# Patient Record
Sex: FEMALE | Race: WHITE | Employment: FULL TIME | URBAN - METROPOLITAN AREA
[De-identification: names, ages, dates, MRNs, and addresses within clinical notes are randomized per-mention and may not be internally consistent; named-entity substitution may affect disease eponyms.]

---

## 2023-04-15 ENCOUNTER — HOSPITAL ENCOUNTER (OUTPATIENT)
Age: 38
Discharge: HOME | End: 2023-04-15
Payer: COMMERCIAL

## 2023-04-15 VITALS
HEART RATE: 102 BPM | OXYGEN SATURATION: 100 % | RESPIRATION RATE: 18 BRPM | DIASTOLIC BLOOD PRESSURE: 78 MMHG | SYSTOLIC BLOOD PRESSURE: 119 MMHG | TEMPERATURE: 98 F

## 2023-04-15 DIAGNOSIS — J02.9 VIRAL PHARYNGITIS: ICD-10-CM

## 2023-04-15 DIAGNOSIS — J06.9 VIRAL URI: Primary | ICD-10-CM

## 2023-04-15 LAB
S PYO AG THROAT QL: NEGATIVE
SARS-COV-2 RNA RESP QL NAA+PROBE: NOT DETECTED

## 2023-04-15 RX ORDER — DEXAMETHASONE 4 MG/1
8 TABLET ORAL ONCE
Status: COMPLETED | OUTPATIENT
Start: 2023-04-15 | End: 2023-04-15

## 2023-04-15 NOTE — ED INITIAL ASSESSMENT (HPI)
Pt here with a sore throat  And dry cough for a week. Cough has been dry until this morning and is now coughing up green sputum. Pt wakes up in the mornings with congestion and then it clears up. Negative home covid test on Wednesday.

## 2023-05-01 ENCOUNTER — OFFICE VISIT (OUTPATIENT)
Dept: ALLERGY | Facility: CLINIC | Age: 38
End: 2023-05-01

## 2023-05-01 VITALS — SYSTOLIC BLOOD PRESSURE: 115 MMHG | OXYGEN SATURATION: 97 % | DIASTOLIC BLOOD PRESSURE: 75 MMHG | HEART RATE: 100 BPM

## 2023-05-01 DIAGNOSIS — Z23 FLU VACCINE NEED: ICD-10-CM

## 2023-05-01 DIAGNOSIS — J30.2 SEASONAL AND PERENNIAL ALLERGIC RHINOCONJUNCTIVITIS: Primary | ICD-10-CM

## 2023-05-01 DIAGNOSIS — J30.89 SEASONAL AND PERENNIAL ALLERGIC RHINOCONJUNCTIVITIS: Primary | ICD-10-CM

## 2023-05-01 DIAGNOSIS — Z92.29 COVID-19 VACCINE SERIES COMPLETED: ICD-10-CM

## 2023-05-01 DIAGNOSIS — J01.20 ACUTE ETHMOIDAL SINUSITIS, RECURRENCE NOT SPECIFIED: ICD-10-CM

## 2023-05-01 DIAGNOSIS — H10.10 SEASONAL AND PERENNIAL ALLERGIC RHINOCONJUNCTIVITIS: Primary | ICD-10-CM

## 2023-05-01 PROCEDURE — 3074F SYST BP LT 130 MM HG: CPT | Performed by: ALLERGY & IMMUNOLOGY

## 2023-05-01 PROCEDURE — 3078F DIAST BP <80 MM HG: CPT | Performed by: ALLERGY & IMMUNOLOGY

## 2023-05-01 PROCEDURE — 99204 OFFICE O/P NEW MOD 45 MIN: CPT | Performed by: ALLERGY & IMMUNOLOGY

## 2023-05-01 RX ORDER — AMOXICILLIN AND CLAVULANATE POTASSIUM 875; 125 MG/1; MG/1
1 TABLET, FILM COATED ORAL 2 TIMES DAILY
Qty: 20 TABLET | Refills: 0 | Status: SHIPPED | OUTPATIENT
Start: 2023-05-01 | End: 2023-05-11

## 2023-05-01 RX ORDER — LORATADINE 10 MG/1
10 TABLET ORAL DAILY
COMMUNITY
End: 2023-05-01

## 2023-05-01 RX ORDER — PREDNISONE 20 MG/1
TABLET ORAL
Qty: 10 TABLET | Refills: 0 | Status: SHIPPED | OUTPATIENT
Start: 2023-05-01

## 2023-05-01 RX ORDER — LEVOCETIRIZINE DIHYDROCHLORIDE 5 MG/1
5 TABLET, FILM COATED ORAL EVERY EVENING
Qty: 90 TABLET | Refills: 1 | Status: SHIPPED | OUTPATIENT
Start: 2023-05-01

## 2023-05-01 NOTE — PATIENT INSTRUCTIONS
#1 allergic rhinitis  Reviewed avoidance measures and potential treatment option immunotherapy  Recommend skin testing at next visit once patient to be off antihistamines for least 5 days  Increase prednisone from 20 mg once a day to 40 mg once a day with food x5 days  Continue with Singulair, montelukast 10 mg once a day  Continue with Flonase 2 sprays per nostril once a day  Reviewed potential side effects with Singulair  Trial of Xyzal, levocetirizine 5 mg once a day in place of Claritin  Continue with nasal saline sinus rinses    #2 acute sinusitis  Start Augmentin 875 twice a day x10 days  Continue with Mucinex D and sinus rinses. #3 COVID vaccines up-to-date. #4 flu vaccine up-to-date     skin testing at next visit once patient be off antihistamines for at least 5 days  Including Xyzal Claritin Allegra Zyrte          Orders This Visit:  No orders of the defined types were placed in this encounter. Meds This Visit:  Requested Prescriptions     Signed Prescriptions Disp Refills    levocetirizine 5 MG Oral Tab 90 tablet 1     Sig: Take 1 tablet (5 mg total) by mouth every evening. predniSONE 20 MG Oral Tab 10 tablet 0     Sig: Take 2 tabs(40 mg) once a day with food x 5 days    amoxicillin clavulanate 875-125 MG Oral Tab 20 tablet 0     Sig: Take 1 tablet by mouth 2 (two) times daily for 10 days.

## 2023-06-27 ENCOUNTER — OFFICE VISIT (OUTPATIENT)
Dept: OBGYN CLINIC | Facility: CLINIC | Age: 38
End: 2023-06-27
Payer: COMMERCIAL

## 2023-06-27 VITALS
SYSTOLIC BLOOD PRESSURE: 100 MMHG | HEIGHT: 62 IN | WEIGHT: 126 LBS | BODY MASS INDEX: 23.19 KG/M2 | DIASTOLIC BLOOD PRESSURE: 72 MMHG

## 2023-06-27 DIAGNOSIS — Z80.49 FAMILY HISTORY OF UTERINE CANCER: ICD-10-CM

## 2023-06-27 DIAGNOSIS — Z01.419 WOMEN'S ANNUAL ROUTINE GYNECOLOGICAL EXAMINATION: Primary | ICD-10-CM

## 2023-06-27 PROBLEM — N64.4 BREAST PAIN: Status: ACTIVE | Noted: 2023-06-27

## 2023-06-27 PROCEDURE — 99385 PREV VISIT NEW AGE 18-39: CPT | Performed by: OBSTETRICS & GYNECOLOGY

## 2023-06-27 PROCEDURE — 3078F DIAST BP <80 MM HG: CPT | Performed by: OBSTETRICS & GYNECOLOGY

## 2023-06-27 PROCEDURE — 3008F BODY MASS INDEX DOCD: CPT | Performed by: OBSTETRICS & GYNECOLOGY

## 2023-06-27 PROCEDURE — 3074F SYST BP LT 130 MM HG: CPT | Performed by: OBSTETRICS & GYNECOLOGY

## 2023-06-27 RX ORDER — SPIRONOLACTONE 50 MG/1
TABLET, FILM COATED ORAL
COMMUNITY

## 2023-06-27 RX ORDER — FLUOXETINE 10 MG/1
10 CAPSULE ORAL DAILY
COMMUNITY
Start: 2023-04-26 | End: 2023-06-27

## 2023-06-27 RX ORDER — FLUTICASONE PROPIONATE 50 MCG
2 SPRAY, SUSPENSION (ML) NASAL 2 TIMES DAILY
COMMUNITY

## 2023-06-27 RX ORDER — FLUOXETINE 10 MG/1
10 CAPSULE ORAL DAILY
Qty: 30 CAPSULE | Refills: 1 | Status: SHIPPED | OUTPATIENT
Start: 2023-06-27

## 2023-06-27 RX ORDER — MONTELUKAST SODIUM 10 MG/1
TABLET ORAL
COMMUNITY
Start: 2023-04-29

## 2023-07-31 ENCOUNTER — NURSE ONLY (OUTPATIENT)
Dept: ALLERGY | Facility: CLINIC | Age: 38
End: 2023-07-31

## 2023-07-31 ENCOUNTER — TELEPHONE (OUTPATIENT)
Dept: ALLERGY | Facility: CLINIC | Age: 38
End: 2023-07-31

## 2023-07-31 ENCOUNTER — OFFICE VISIT (OUTPATIENT)
Dept: ALLERGY | Facility: CLINIC | Age: 38
End: 2023-07-31

## 2023-07-31 VITALS — SYSTOLIC BLOOD PRESSURE: 111 MMHG | OXYGEN SATURATION: 100 % | DIASTOLIC BLOOD PRESSURE: 71 MMHG | HEART RATE: 79 BPM

## 2023-07-31 DIAGNOSIS — J30.2 SEASONAL AND PERENNIAL ALLERGIC RHINOCONJUNCTIVITIS: Primary | ICD-10-CM

## 2023-07-31 DIAGNOSIS — J30.89 SEASONAL AND PERENNIAL ALLERGIC RHINOCONJUNCTIVITIS: Primary | ICD-10-CM

## 2023-07-31 DIAGNOSIS — J01.20 ACUTE ETHMOIDAL SINUSITIS, RECURRENCE NOT SPECIFIED: ICD-10-CM

## 2023-07-31 DIAGNOSIS — H10.10 SEASONAL AND PERENNIAL ALLERGIC RHINOCONJUNCTIVITIS: Primary | ICD-10-CM

## 2023-07-31 DIAGNOSIS — Z92.29 COVID-19 VACCINE SERIES COMPLETED: ICD-10-CM

## 2023-07-31 PROCEDURE — 99214 OFFICE O/P EST MOD 30 MIN: CPT | Performed by: ALLERGY & IMMUNOLOGY

## 2023-07-31 PROCEDURE — 95004 PERQ TESTS W/ALRGNC XTRCS: CPT | Performed by: ALLERGY & IMMUNOLOGY

## 2023-07-31 PROCEDURE — 3078F DIAST BP <80 MM HG: CPT | Performed by: ALLERGY & IMMUNOLOGY

## 2023-07-31 PROCEDURE — 3074F SYST BP LT 130 MM HG: CPT | Performed by: ALLERGY & IMMUNOLOGY

## 2023-07-31 PROCEDURE — 95024 IQ TESTS W/ALLERGENIC XTRCS: CPT | Performed by: ALLERGY & IMMUNOLOGY

## 2023-07-31 RX ORDER — MONTELUKAST SODIUM 10 MG/1
TABLET ORAL
Qty: 90 TABLET | Refills: 2 | Status: SHIPPED | OUTPATIENT
Start: 2023-07-31

## 2023-07-31 NOTE — TELEPHONE ENCOUNTER
Patient to start allergy shots on 08/09/23  Patient aware to take a 24 hour antihistamines at least 2 hours before appointment and to feel well/healthy   RN informed patient that she will need to stay 30 minutes for each appointment but plan for the 1st appointment to be longer due to consent and paperwork  Patient verbalized understanding and denied further questions  [facility]

## 2023-07-31 NOTE — PATIENT INSTRUCTIONS
1 allergic rhinitis  Overall 80% improved with medications including Xyzal Singulair and Flonase  Patient is considering immunotherapy as a parenteral additive treatment option. See above skin testing to screen for allergic triggers  Reviewed avoidance measures and potential treatment option immunotherapy  Denies side effects with medications including Singulair  Singular refilled  As above  Immunotherapy program discussed in detail, including the potential adverse events of local and systemic reaction. Reviewed 30 minute wait in office after receiving immunotherapy. Pt/parent understands, agrees and wishes to proceed. Consent to be signed prior to starting. #2 COVID vaccines completed. Recommend booster when indicated    #3 acute sinusitis. Resolved.   No further episodes in the interim    #4 flu vaccine recommended in the fall

## 2023-07-31 NOTE — PROGRESS NOTES
Sari Novoa is a 43year old female. HPI:   Patient presents with: Allergies: Here for environmental/seasonal allergy testing. Symptoms include sneezing, post nasal drip, and sore throat. No antihistamines for 5 days. Patient is a 18-year-old female who presents for follow-up with a chief complaint of allergies  Patient last seen by me in May 2023. No prior skin testing at that time as patient was on antihistamines. At last visit patient history of acute sinusitis and allergic rhinitis    Medication list include Xyzal Flonase and Singulair  COVID vaccines up-to-date    Last visit patient was started on Dymista    Today patient presents for follow-up and potential repeat allergy testing    Today patient reports    Ar:  Active or persistent symptoms: sz pnd, st   80 % better   Active meds: Flonase Xyzal Singulair  pets : none     No abx or pred in interim     COVID-vaccine x3 doses including by Valent    Pt request retesting     Considering ait              HISTORY:  Past Medical History:   Diagnosis Date    Anxiety     Family history of uterine cancer     with mom, mat aunt and mat cousin      Past Surgical History:   Procedure Laterality Date    PATIENT DENIES ANY SURGICAL HISTORY        Family History   Problem Relation Age of Onset    Uterine Cancer Mother 71    Uterine Cancer Maternal Aunt     Uterine Cancer Maternal Cousin Female       Social History:   Social History     Socioeconomic History    Marital status:    Tobacco Use    Smoking status: Former     Types: Cigarettes    Smokeless tobacco: Never    Tobacco comments:     Former use at about age 24    Vaping Use    Vaping Use: Never used   Substance and Sexual Activity    Alcohol use: Yes     Comment: occ    Drug use: Never    Sexual activity: Yes     Partners: Male     Birth control/protection: Condom   Other Topics Concern    Blood Transfusions No   Social History Narrative    Pt denies h/x abuse.          Medications (Active prior to today's visit):  Current Outpatient Medications   Medication Sig Dispense Refill    montelukast 10 MG Oral Tab 10 mg once a day orally 90 tablet 2    fluticasone propionate 50 MCG/ACT Nasal Suspension 2 sprays by Each Nare route 2 (two) times daily. spironolactone 50 MG Oral Tab       FLUoxetine 10 MG Oral Cap Take 1 capsule (10 mg total) by mouth daily. 30 capsule 1    levocetirizine 5 MG Oral Tab Take 1 tablet (5 mg total) by mouth every evening.  (Patient not taking: Reported on 7/31/2023) 90 tablet 1       Allergies:    Sulfamethoxazole W/*    RASH    Comment:With itching      ROS:   Allergic/Immuno:  See hpi  Cardiovascular:  Negative for irregular heartbeat/palpitations, chest pain, edema  Constitutional:  Negative night sweats,weight loss, irritability and lethargy  ENMT:  Negative for ear drainage, hearing loss and nasal drainage  Eyes:  Negative for eye discharge and vision loss  Gastrointestinal:  Negative for abdominal pain, diarrhea and vomiting  Integumentary:  Negative for pruritus and rash  Respiratory:  Negative for cough, dyspnea and wheezing    PHYSICAL EXAM:   Constitutional: responsive, no acute distress noted  Head/Face: NC/Atraumatic  Eyes/Vision: conjunctiva and lids are normal extraocular motion is intact   Ears/Audiometry: tympanic membranes are normal bilaterally hearing is grossly intact  Nose/Mouth/Throat: nose and throat are clear mucous membranes are moist   Neck/Thyroid: neck is supple without adenopathy  Lymphatic: no abnormal cervical, supraclavicular or axillary adenopathy is noted  Respiratory: normal to inspection lungs are clear to auscultation bilaterally normal respiratory effort   Cardiovascular: regular rate and rhythm no murmurs, gallups, or rubs  Abdomen: soft non-tender non-distended  Skin/Hair: no unusual rashes present   Extremities: no edema, cyanosis, or clubbing     ASSESSMENT/PLAN:   Assessment   Seasonal and perennial allergic rhinoconjunctivitis  (primary encounter diagnosis)  Covid-19 vaccine series completed  Acute ethmoidal sinusitis, recurrence not specified    Skin testing today to common indoor and outdoor environmental allergies was + to weeds, mold dog      Positive histamine control    #1 allergic rhinitis  Overall 80% improved with medications including Xyzal Singulair and Flonase  Patient is considering immunotherapy as a parenteral additive treatment option. See above skin testing to screen for allergic triggers  Reviewed avoidance measures and potential treatment option immunotherapy  Denies side effects with medications including Singulair  Singular refilled  As above  Immunotherapy program discussed in detail, including the potential adverse events of local and systemic reaction. Reviewed 30 minute wait in office after receiving immunotherapy. Pt/parent understands, agrees and wishes to proceed. Consent to be signed prior to starting. #2 COVID vaccines completed. Recommend booster when indicated    #3 acute sinusitis. Resolved. No further episodes in the interim    #4 flu vaccine recommended in the fall      F/u in 6 months       Over 30 spent on care and visit         Orders This Visit:  No orders of the defined types were placed in this encounter. Meds This Visit:  Requested Prescriptions     Signed Prescriptions Disp Refills    montelukast 10 MG Oral Tab 90 tablet 2     Sig: 10 mg once a day orally       Imaging & Referrals:  None     7/31/2023  Shobha Powell MD    If medication samples were provided today, they were provided solely for patient education and training related to self administration of these medications. Teaching, instruction and sample was provided to the patient by myself. Teaching included  a review of potential adverse side effects as well as potential efficacy. Patient's questions were answered in regards to medication administration and dosing and potential side effects.  Teaching was provided via the teach back method

## 2023-08-08 ENCOUNTER — PATIENT MESSAGE (OUTPATIENT)
Dept: ALLERGY | Facility: CLINIC | Age: 38
End: 2023-08-08

## 2023-08-08 NOTE — TELEPHONE ENCOUNTER
From: Lakeisha Martin  To: Mabel Urrutia MD  Sent: 8/8/2023 3:12 PM CDT  Subject: CPT Code    Hi there,     Wondering if you can give me the CPT Code for the allergy injections to verify coverage with my insurance, please? Thanks in advance!      Deisy Strong

## 2023-08-09 NOTE — TELEPHONE ENCOUNTER
RN called to patient  Verified patient's name and   RN informed patient that note for medical necessity was created for patient to receive allergy shots  Per patient will keep appointment for today to  letter but will hold off for first shot for now until insurance approves the letter  Patient verbalized understanding to plan of care and denied further questions

## 2023-08-09 NOTE — TELEPHONE ENCOUNTER
Dr. Aundrea Pittman please see patient's MyChart message and advise, thank you    [facility],      Thanks so much for your speedy response. Just spoke to my insurance and this was the reply:   Can be covered if it falls under medical necessity, but they need documentation. Dr has to fill out or state that it is medically necessary and that it NEEDS to be done. That it needs to be required at the time of service. Additional clarification can be requested at  [phone] if needed. Is that possible - I'm unsure now if I qualify then? \"

## 2023-08-09 NOTE — TELEPHONE ENCOUNTER
Call reviewed and noted  May provide letter stating \"allergy shots/immunotherapy are of  medical necessity and and needed to treat her underlying environmental allergens to provide preventative treatment\"

## 2023-08-21 RX ORDER — FLUOXETINE 10 MG/1
10 CAPSULE ORAL DAILY
Qty: 30 CAPSULE | Refills: 1 | Status: SHIPPED | OUTPATIENT
Start: 2023-08-21

## 2023-08-30 ENCOUNTER — OFFICE VISIT (OUTPATIENT)
Dept: OBGYN CLINIC | Facility: CLINIC | Age: 38
End: 2023-08-30
Payer: COMMERCIAL

## 2023-08-30 VITALS — BODY MASS INDEX: 22.84 KG/M2 | HEIGHT: 62 IN | WEIGHT: 124.13 LBS

## 2023-08-30 DIAGNOSIS — Z12.4 SCREENING FOR CERVICAL CANCER: Primary | ICD-10-CM

## 2023-08-30 PROCEDURE — 87624 HPV HI-RISK TYP POOLED RSLT: CPT | Performed by: OBSTETRICS & GYNECOLOGY

## 2023-08-30 PROCEDURE — 3008F BODY MASS INDEX DOCD: CPT | Performed by: OBSTETRICS & GYNECOLOGY

## 2023-08-31 LAB — HPV I/H RISK 1 DNA SPEC QL NAA+PROBE: NEGATIVE

## 2023-09-15 ENCOUNTER — TELEPHONE (OUTPATIENT)
Dept: OBGYN CLINIC | Facility: CLINIC | Age: 38
End: 2023-09-15

## 2023-09-15 RX ORDER — FLUOXETINE 10 MG/1
10 CAPSULE ORAL DAILY
Qty: 90 CAPSULE | Refills: 3 | Status: SHIPPED | OUTPATIENT
Start: 2023-09-15

## 2023-10-23 RX ORDER — LEVOCETIRIZINE DIHYDROCHLORIDE 5 MG/1
5 TABLET, FILM COATED ORAL EVERY EVENING
Qty: 90 TABLET | Refills: 1 | Status: SHIPPED | OUTPATIENT
Start: 2023-10-23

## 2023-10-23 NOTE — TELEPHONE ENCOUNTER
Refill requested for   Requested Prescriptions   Pending Prescriptions Disp Refills    LEVOCETIRIZINE 5 MG Oral Tab [Pharmacy Med Name: LEVOCETIRIZINE 5 MG TABLET] 90 tablet 1     Sig: TAKE 1 TABLET BY MOUTH EVERY DAY IN THE EVENING       Antihistamines Passed - 10/23/2023  1:32 AM        Passed - Appt in past 12 mos or next 1 mos     Recent Outpatient Visits              1 month ago Screening for cervical cancer    [facility], [address] Demar Mendoza MD    Office Visit    2 months ago Seasonal and perennial allergic rhinoconjunctivitis    [facility], [address], [place]    Nurse Only    2 months ago Seasonal and perennial allergic rhinoconjunctivitis    [address] [facility] Melissa Shaikh MD    Office Visit    3 months ago [employer] annual routine gynecological examination    [address], [address], [address] Demar Mendoza MD    Office Visit    5 months ago Seasonal and perennial allergic rhinoconjunctivitis    [address] [facility], Brandon Velasquez MD    Office Visit                          Last office visit: 7/31/23    Previously advised to follow up in No follow-up timeline advised in last office visit. Diagnosis of AR advised to follow-up in 1 year    F/U currently scheduled?  Not at this time    ACTION: Refill filled per protocol

## 2024-02-19 ENCOUNTER — TELEPHONE (OUTPATIENT)
Dept: ALLERGY | Facility: CLINIC | Age: 39
End: 2024-02-19

## 2024-02-19 ENCOUNTER — PATIENT MESSAGE (OUTPATIENT)
Dept: ALLERGY | Facility: CLINIC | Age: 39
End: 2024-02-19

## 2024-02-19 NOTE — TELEPHONE ENCOUNTER
From: Georgie Boykin  To: Osman Urrutia  Sent: 2/19/2024 10:26 AM CST  Subject: Injections Question    Good morning,     I'm wondering if I'm still eligible to schedule allergy injection appointments. I had some scheduling conflicts the past several months so I wasn't able to before but still am interested in them, if possible.    Thank you,  Linsey

## 2024-02-19 NOTE — TELEPHONE ENCOUNTER
Patient is starting allergy shots tomorrow, 2/20/24.    Last office visit was 7/31/23 and tested to Mold, Weeds and Dog.      May we have orders ? thanks

## 2024-02-19 NOTE — TELEPHONE ENCOUNTER
RN called pt to reschedule AIT.    Pt will be starting AIT tomorrow morning.  RN advised that pt should be feeling well prior to injections and to take a 24 hour antihistamines prior to injections.  RN also advised that she must wait 30 minutes for monitoring.     Pt verbalizes understanding and denies any further questions or concerns.

## 2024-02-20 ENCOUNTER — NURSE ONLY (OUTPATIENT)
Dept: ALLERGY | Facility: CLINIC | Age: 39
End: 2024-02-20
Payer: COMMERCIAL

## 2024-02-20 DIAGNOSIS — J30.89 ENVIRONMENTAL AND SEASONAL ALLERGIES: Primary | ICD-10-CM

## 2024-02-20 PROCEDURE — 95117 IMMUNOTHERAPY INJECTIONS: CPT | Performed by: ALLERGY & IMMUNOLOGY

## 2024-02-20 PROCEDURE — 95165 ANTIGEN THERAPY SERVICES: CPT | Performed by: ALLERGY & IMMUNOLOGY

## 2024-02-29 ENCOUNTER — NURSE ONLY (OUTPATIENT)
Dept: ALLERGY | Facility: CLINIC | Age: 39
End: 2024-02-29
Payer: COMMERCIAL

## 2024-02-29 DIAGNOSIS — J30.89 ENVIRONMENTAL AND SEASONAL ALLERGIES: Primary | ICD-10-CM

## 2024-02-29 PROCEDURE — 95117 IMMUNOTHERAPY INJECTIONS: CPT | Performed by: ALLERGY & IMMUNOLOGY

## 2024-03-05 ENCOUNTER — NURSE ONLY (OUTPATIENT)
Dept: ALLERGY | Facility: CLINIC | Age: 39
End: 2024-03-05
Payer: COMMERCIAL

## 2024-03-05 DIAGNOSIS — J30.89 ENVIRONMENTAL AND SEASONAL ALLERGIES: Primary | ICD-10-CM

## 2024-03-05 PROCEDURE — 95117 IMMUNOTHERAPY INJECTIONS: CPT | Performed by: ALLERGY & IMMUNOLOGY

## 2024-03-14 ENCOUNTER — NURSE ONLY (OUTPATIENT)
Dept: ALLERGY | Facility: CLINIC | Age: 39
End: 2024-03-14
Payer: COMMERCIAL

## 2024-03-14 DIAGNOSIS — J30.89 ENVIRONMENTAL AND SEASONAL ALLERGIES: Primary | ICD-10-CM

## 2024-03-14 PROCEDURE — 95117 IMMUNOTHERAPY INJECTIONS: CPT | Performed by: ALLERGY & IMMUNOLOGY

## 2024-04-03 ENCOUNTER — NURSE ONLY (OUTPATIENT)
Dept: ALLERGY | Facility: CLINIC | Age: 39
End: 2024-04-03
Payer: COMMERCIAL

## 2024-04-03 DIAGNOSIS — J30.89 ENVIRONMENTAL AND SEASONAL ALLERGIES: Primary | ICD-10-CM

## 2024-04-03 PROCEDURE — 95117 IMMUNOTHERAPY INJECTIONS: CPT | Performed by: ALLERGY & IMMUNOLOGY

## 2024-04-11 ENCOUNTER — NURSE ONLY (OUTPATIENT)
Dept: ALLERGY | Facility: CLINIC | Age: 39
End: 2024-04-11
Payer: COMMERCIAL

## 2024-04-11 DIAGNOSIS — J30.89 ENVIRONMENTAL AND SEASONAL ALLERGIES: Primary | ICD-10-CM

## 2024-04-11 PROCEDURE — 95117 IMMUNOTHERAPY INJECTIONS: CPT | Performed by: ALLERGY & IMMUNOLOGY

## 2024-04-11 PROCEDURE — 95165 ANTIGEN THERAPY SERVICES: CPT | Performed by: ALLERGY & IMMUNOLOGY

## 2024-04-15 ENCOUNTER — NURSE ONLY (OUTPATIENT)
Dept: ALLERGY | Facility: CLINIC | Age: 39
End: 2024-04-15
Payer: COMMERCIAL

## 2024-04-15 DIAGNOSIS — J30.89 ENVIRONMENTAL AND SEASONAL ALLERGIES: Primary | ICD-10-CM

## 2024-04-15 PROCEDURE — 95117 IMMUNOTHERAPY INJECTIONS: CPT | Performed by: ALLERGY & IMMUNOLOGY

## 2024-04-18 ENCOUNTER — NURSE ONLY (OUTPATIENT)
Dept: ALLERGY | Facility: CLINIC | Age: 39
End: 2024-04-18
Payer: COMMERCIAL

## 2024-04-18 DIAGNOSIS — J30.89 ENVIRONMENTAL AND SEASONAL ALLERGIES: Primary | ICD-10-CM

## 2024-04-18 PROCEDURE — 95117 IMMUNOTHERAPY INJECTIONS: CPT | Performed by: ALLERGY & IMMUNOLOGY

## 2024-04-22 ENCOUNTER — NURSE ONLY (OUTPATIENT)
Dept: ALLERGY | Facility: CLINIC | Age: 39
End: 2024-04-22
Payer: COMMERCIAL

## 2024-04-22 DIAGNOSIS — J30.89 ENVIRONMENTAL AND SEASONAL ALLERGIES: Primary | ICD-10-CM

## 2024-04-22 RX ORDER — MONTELUKAST SODIUM 10 MG/1
TABLET ORAL
Qty: 30 TABLET | Refills: 0 | Status: SHIPPED | OUTPATIENT
Start: 2024-04-22

## 2024-04-22 NOTE — TELEPHONE ENCOUNTER
Received a refill request for Singulair 10 mg- 1 tablet by mouth daily.    Last office visit was 7/31/23 for allergies and per Dr. Urrutia's progress notes to follow up in 6 months.    Refill for 30 days granted.    Conyac message sent to patient to please contact our office to schedule a follow up visit either in person or telemed.

## 2024-04-29 ENCOUNTER — NURSE ONLY (OUTPATIENT)
Dept: ALLERGY | Facility: CLINIC | Age: 39
End: 2024-04-29
Payer: COMMERCIAL

## 2024-04-29 DIAGNOSIS — J30.89 ENVIRONMENTAL AND SEASONAL ALLERGIES: Primary | ICD-10-CM

## 2024-05-06 ENCOUNTER — NURSE ONLY (OUTPATIENT)
Dept: ALLERGY | Facility: CLINIC | Age: 39
End: 2024-05-06
Payer: COMMERCIAL

## 2024-05-06 DIAGNOSIS — J30.89 ENVIRONMENTAL AND SEASONAL ALLERGIES: Primary | ICD-10-CM

## 2024-05-13 ENCOUNTER — TELEMEDICINE (OUTPATIENT)
Dept: ALLERGY | Facility: CLINIC | Age: 39
End: 2024-05-13
Payer: COMMERCIAL

## 2024-05-13 ENCOUNTER — NURSE ONLY (OUTPATIENT)
Dept: ALLERGY | Facility: CLINIC | Age: 39
End: 2024-05-13
Payer: COMMERCIAL

## 2024-05-13 DIAGNOSIS — J30.89 SEASONAL AND PERENNIAL ALLERGIC RHINOCONJUNCTIVITIS: Primary | ICD-10-CM

## 2024-05-13 DIAGNOSIS — Z23 FLU VACCINE NEED: ICD-10-CM

## 2024-05-13 DIAGNOSIS — J30.89 ENVIRONMENTAL AND SEASONAL ALLERGIES: Primary | ICD-10-CM

## 2024-05-13 DIAGNOSIS — J30.2 SEASONAL AND PERENNIAL ALLERGIC RHINOCONJUNCTIVITIS: Primary | ICD-10-CM

## 2024-05-13 DIAGNOSIS — H10.10 SEASONAL AND PERENNIAL ALLERGIC RHINOCONJUNCTIVITIS: Primary | ICD-10-CM

## 2024-05-13 DIAGNOSIS — Z23 NEED FOR COVID-19 VACCINE: ICD-10-CM

## 2024-05-13 NOTE — PROGRESS NOTES
Georgie Boykin is a 38 year old female.    HPI:   No chief complaint on file.  Patient is a 38-year-old female who presents for follow-up with a chief complaint of allergies    Patient last seen by me in July 2022  Patient presents via video visit    This visit is conducted using Telemedicine with live, interactive video and audio during this Coronavirus pandemic.     Please note that the following visit was completed using two-way, real-time interactive audio and/or video communication.  This has been done in good isra to provide continuity of care in the best interest of the provider-patient relationship, due to the ongoing public health crisis/national emergency and because of restrictions of visitation.  There are limitations of this visit as no physical exam could be performed.  Every conscious effort was taken to allow for sufficient and adequate time.  This billing was spent on reviewing labs, medications, radiology tests and decision making.  Appropriate medical decision-making and tests are ordered as detailed in the plan of care below     Patient with a history of allergic rhinitis.  Positive skin testing to weeds mold and dog.  Patient currently on immunotherapy underlying environmental allergies.    Medication list include Xyzal Singulair Flonase  80% better with meds  Patient currently on buildup phase immunotherapy to underlying environmental allergies.  Current dosing are yellow vials 1-10,000 0.3 mL    COVID-vaccine x 5 doses last in October 2023  No flu vaccine on record    Today patient reports    Ar:  Active or persistent symptoms: some better with ait to date   Active meds: flonase , xyzal   Ran out of singulair   pets :  2 dogs     No sinus infection in interim           HISTORY:  Past Medical History:    Anxiety    Family history of uterine cancer    with mom, mat aunt and mat cousin      Past Surgical History:   Procedure Laterality Date    Patient denies any surgical history        Family  History   Problem Relation Age of Onset    Uterine Cancer Mother 69    Uterine Cancer Maternal Aunt     Uterine Cancer Maternal Cousin Female       Social History:   Social History     Socioeconomic History    Marital status:    Tobacco Use    Smoking status: Former     Types: Cigarettes    Smokeless tobacco: Never    Tobacco comments:     Former use at about age 17    Vaping Use    Vaping status: Never Used   Substance and Sexual Activity    Alcohol use: Yes     Comment: occ    Drug use: Never    Sexual activity: Yes     Partners: Male     Birth control/protection: Condom   Other Topics Concern    Blood Transfusions No   Social History Narrative    Pt denies h/x abuse.         Medications (Active prior to today's visit):  Current Outpatient Medications   Medication Sig Dispense Refill    montelukast 10 MG Oral Tab TAKE 1 TABLET BY MOUTH EVERY DAY 30 tablet 0    LEVOCETIRIZINE 5 MG Oral Tab TAKE 1 TABLET BY MOUTH EVERY DAY IN THE EVENING 90 tablet 1    FLUoxetine 10 MG Oral Cap Take 1 capsule (10 mg total) by mouth daily. 90 capsule 3    fluticasone propionate 50 MCG/ACT Nasal Suspension 2 sprays by Each Nare route 2 (two) times daily.      spironolactone 50 MG Oral Tab          Allergies:  Allergies   Allergen Reactions    Sulfamethoxazole W/Trimethoprim RASH     With itching         ROS:   Allergic/Immuno:  See hpi  Cardiovascular:  Negative for irregular heartbeat/palpitations, chest pain, edema  Constitutional:  Negative night sweats,weight loss, irritability and lethargy  ENMT:  Negative for ear drainage, hearing loss and nasal drainage  Eyes:  Negative for eye discharge and vision loss  Gastrointestinal:  Negative for abdominal pain, diarrhea and vomiting  Integumentary:  Negative for pruritus and rash  Respiratory:  Negative for cough, dyspnea and wheezing    PHYSICAL EXAM:   Constitutional: responsive, no acute distress noted  Head/Face: NC/Atraumatic  Eyes/Vision: conjunctiva and lids are normal  extraocular motion is intact   Ears/Audiometry: tympanic membranes are normal bilaterally hearing is grossly intact  Nose/Mouth/Throat: nose and throat are clear mucous membranes are moist   Neck/Thyroid: neck is supple without adenopathy  Lymphatic: no abnormal cervical, supraclavicular or axillary adenopathy is noted  Respiratory: normal to inspection lungs are clear to auscultation bilaterally normal respiratory effort   Cardiovascular: regular rate and rhythm no murmurs, gallups, or rubs  Abdomen: soft non-tender non-distended  Skin/Hair: no unusual rashes present   Extremities: no edema, cyanosis, or clubbing     ASSESSMENT/PLAN:   Assessment   Encounter Diagnoses   Name Primary?    Seasonal and perennial allergic rhinoconjunctivitis Yes    Flu vaccine need     Need for COVID-19 vaccine      #1 allergic rhinitis  Has started immunotherapy in the interim.  Currently on buildup phase yellow vial 0.1 mL.  Weekly dosing at this time.  Does report some improvement since starting immunotherapy.  Immunotherapy is to mold weeds and dog.  2 dogs in the home.  Continue with Xyzal and Flonase.  Restart Singulair and increasing symptoms.  Reviewed avoidance measures  Meds already refilled       #2 flu vaccine recommended in the fall      #3 COVID vaccines up-to-date including booster from October 2023           Orders This Visit:  No orders of the defined types were placed in this encounter.      Meds This Visit:  Requested Prescriptions      No prescriptions requested or ordered in this encounter       Imaging & Referrals:  None     5/13/2024  Osman Urrutia MD    If medication samples were provided today, they were provided solely for patient education and training related to self administration of these medications.  Teaching, instruction and sample was provided to the patient by myself.  Teaching included  a review of potential adverse side effects as well as potential efficacy.  Patient's questions were answered in  regards to medication administration and dosing and potential side effects. Teaching was provided via the teach back method

## 2024-05-15 RX ORDER — LEVOCETIRIZINE DIHYDROCHLORIDE 5 MG/1
5 TABLET, FILM COATED ORAL EVERY EVENING
Qty: 90 TABLET | Refills: 1 | Status: SHIPPED | OUTPATIENT
Start: 2024-05-15

## 2024-05-15 NOTE — TELEPHONE ENCOUNTER
Refill requested for   Requested Prescriptions   Pending Prescriptions Disp Refills    levocetirizine 5 MG Oral Tab 90 tablet 1     Sig: Take 1 tablet (5 mg total) by mouth every evening.       Antihistamines Passed - 5/15/2024  8:30 AM        Passed - Appt in past 12 mos or next 1 mos     Recent Outpatient Visits              2 days ago Environmental and seasonal allergies [J30.89]    [facility]    Nurse Only    2 days ago Seasonal and perennial allergic rhinoconjunctivitis    [facility] Ghada, Osman NOE MD    Telemedicine    1 week ago Environmental and seasonal allergies    [facility]    Nurse Only    2 weeks ago Environmental and seasonal allergies [J30.89]    [facility]    Nurse Only    3 weeks ago Environmental and seasonal allergies [J30.89]    [facility]    Nurse Only          Future Appointments         Provider Department Appt Notes    In 5 days [facility]     In 1 week [facility]                           Last office visit: 5/13/24    Previously advised to follow up in one year    F/U currently scheduled? none      Date of last refill: 10/23/23    ACTION: Refilled per protocol.

## 2024-05-20 ENCOUNTER — NURSE ONLY (OUTPATIENT)
Dept: ALLERGY | Facility: CLINIC | Age: 39
End: 2024-05-20

## 2024-05-20 DIAGNOSIS — J30.89 ENVIRONMENTAL AND SEASONAL ALLERGIES: Primary | ICD-10-CM

## 2024-06-04 ENCOUNTER — NURSE ONLY (OUTPATIENT)
Dept: ALLERGY | Facility: CLINIC | Age: 39
End: 2024-06-04
Payer: COMMERCIAL

## 2024-06-04 DIAGNOSIS — J30.89 ENVIRONMENTAL AND SEASONAL ALLERGIES: Primary | ICD-10-CM

## 2024-06-04 PROCEDURE — 95117 IMMUNOTHERAPY INJECTIONS: CPT | Performed by: ALLERGY & IMMUNOLOGY

## 2024-06-10 ENCOUNTER — NURSE ONLY (OUTPATIENT)
Dept: ALLERGY | Facility: CLINIC | Age: 39
End: 2024-06-10
Payer: COMMERCIAL

## 2024-06-10 DIAGNOSIS — J30.89 ENVIRONMENTAL AND SEASONAL ALLERGIES: Primary | ICD-10-CM

## 2024-06-17 ENCOUNTER — NURSE ONLY (OUTPATIENT)
Dept: ALLERGY | Facility: CLINIC | Age: 39
End: 2024-06-17
Payer: COMMERCIAL

## 2024-06-17 DIAGNOSIS — J30.89 ENVIRONMENTAL AND SEASONAL ALLERGIES: Primary | ICD-10-CM

## 2024-06-17 PROCEDURE — 95117 IMMUNOTHERAPY INJECTIONS: CPT | Performed by: ALLERGY & IMMUNOLOGY

## 2024-06-17 PROCEDURE — 95165 ANTIGEN THERAPY SERVICES: CPT | Performed by: ALLERGY & IMMUNOLOGY

## 2024-06-24 ENCOUNTER — NURSE ONLY (OUTPATIENT)
Dept: ALLERGY | Facility: CLINIC | Age: 39
End: 2024-06-24

## 2024-06-24 DIAGNOSIS — J30.89 ENVIRONMENTAL AND SEASONAL ALLERGIES: Primary | ICD-10-CM

## 2024-06-24 PROCEDURE — 95117 IMMUNOTHERAPY INJECTIONS: CPT | Performed by: ALLERGY & IMMUNOLOGY

## 2024-06-25 ENCOUNTER — TELEPHONE (OUTPATIENT)
Dept: ALLERGY | Facility: CLINIC | Age: 39
End: 2024-06-25

## 2024-06-25 RX ORDER — MONTELUKAST SODIUM 10 MG/1
TABLET ORAL
Qty: 90 TABLET | Refills: 1 | Status: SHIPPED | OUTPATIENT
Start: 2024-06-25

## 2024-06-25 NOTE — TELEPHONE ENCOUNTER
Received a refill request for Singulair 10 mg- 1 tablet by mouth daily.    Last office visit was 5/13/24 for allergies.    Refill meets protocol- refilled.

## 2024-07-01 ENCOUNTER — NURSE ONLY (OUTPATIENT)
Dept: ALLERGY | Facility: CLINIC | Age: 39
End: 2024-07-01
Payer: COMMERCIAL

## 2024-07-01 DIAGNOSIS — J30.89 ENVIRONMENTAL AND SEASONAL ALLERGIES: Primary | ICD-10-CM

## 2024-07-08 ENCOUNTER — NURSE ONLY (OUTPATIENT)
Dept: ALLERGY | Facility: CLINIC | Age: 39
End: 2024-07-08
Payer: COMMERCIAL

## 2024-07-08 DIAGNOSIS — J30.89 ENVIRONMENTAL AND SEASONAL ALLERGIES: Primary | ICD-10-CM

## 2024-07-08 PROCEDURE — 95117 IMMUNOTHERAPY INJECTIONS: CPT | Performed by: ALLERGY & IMMUNOLOGY

## 2024-07-15 ENCOUNTER — NURSE ONLY (OUTPATIENT)
Dept: ALLERGY | Facility: CLINIC | Age: 39
End: 2024-07-15
Payer: COMMERCIAL

## 2024-07-15 DIAGNOSIS — J30.89 ENVIRONMENTAL AND SEASONAL ALLERGIES: Primary | ICD-10-CM

## 2024-07-15 PROCEDURE — 95117 IMMUNOTHERAPY INJECTIONS: CPT | Performed by: ALLERGY & IMMUNOLOGY

## 2024-07-29 ENCOUNTER — NURSE ONLY (OUTPATIENT)
Dept: ALLERGY | Facility: CLINIC | Age: 39
End: 2024-07-29
Payer: COMMERCIAL

## 2024-07-29 DIAGNOSIS — J30.89 ENVIRONMENTAL AND SEASONAL ALLERGIES: Primary | ICD-10-CM

## 2024-08-06 ENCOUNTER — NURSE ONLY (OUTPATIENT)
Dept: ALLERGY | Facility: CLINIC | Age: 39
End: 2024-08-06
Payer: COMMERCIAL

## 2024-08-06 DIAGNOSIS — J30.89 ENVIRONMENTAL AND SEASONAL ALLERGIES: Primary | ICD-10-CM

## 2024-08-06 PROCEDURE — 95117 IMMUNOTHERAPY INJECTIONS: CPT | Performed by: ALLERGY & IMMUNOLOGY

## 2024-08-13 ENCOUNTER — NURSE ONLY (OUTPATIENT)
Dept: ALLERGY | Facility: CLINIC | Age: 39
End: 2024-08-13
Payer: COMMERCIAL

## 2024-08-13 DIAGNOSIS — J30.89 ENVIRONMENTAL AND SEASONAL ALLERGIES: Primary | ICD-10-CM

## 2024-08-13 PROCEDURE — 95117 IMMUNOTHERAPY INJECTIONS: CPT | Performed by: ALLERGY & IMMUNOLOGY

## 2024-08-19 ENCOUNTER — NURSE ONLY (OUTPATIENT)
Dept: ALLERGY | Facility: CLINIC | Age: 39
End: 2024-08-19
Payer: COMMERCIAL

## 2024-08-19 DIAGNOSIS — J30.89 ENVIRONMENTAL AND SEASONAL ALLERGIES: Primary | ICD-10-CM

## 2024-08-21 ENCOUNTER — PATIENT MESSAGE (OUTPATIENT)
Dept: OBGYN CLINIC | Facility: CLINIC | Age: 39
End: 2024-08-21

## 2024-08-21 NOTE — TELEPHONE ENCOUNTER
From: Georgie Boykin  To: Katja CHAN Juliette  Sent: 8/21/2024 1:41 PM CDT  Subject: Mammogram    Hi there! I just scheduled my first mammogram as it's noted in mychart that I'm \"overdue\", but when I was reading over the details it mentioned: \"You will also need to bring your doctor's order unless your physician's office submitted the order electronically or faxed the order. Without the order, your test may be delayed or postponed.\"    Do I need an order to schedule this? Thanks!

## 2024-08-26 ENCOUNTER — NURSE ONLY (OUTPATIENT)
Dept: ALLERGY | Facility: CLINIC | Age: 39
End: 2024-08-26
Payer: COMMERCIAL

## 2024-09-03 ENCOUNTER — NURSE ONLY (OUTPATIENT)
Dept: ALLERGY | Facility: CLINIC | Age: 39
End: 2024-09-03
Payer: COMMERCIAL

## 2024-09-03 DIAGNOSIS — J30.89 ENVIRONMENTAL AND SEASONAL ALLERGIES: Primary | ICD-10-CM

## 2024-09-03 PROCEDURE — 95117 IMMUNOTHERAPY INJECTIONS: CPT | Performed by: ALLERGY & IMMUNOLOGY

## 2024-09-09 ENCOUNTER — NURSE ONLY (OUTPATIENT)
Dept: ALLERGY | Facility: CLINIC | Age: 39
End: 2024-09-09
Payer: COMMERCIAL

## 2024-09-09 DIAGNOSIS — J30.89 ENVIRONMENTAL AND SEASONAL ALLERGIES: Primary | ICD-10-CM

## 2024-09-17 ENCOUNTER — NURSE ONLY (OUTPATIENT)
Dept: ALLERGY | Facility: CLINIC | Age: 39
End: 2024-09-17
Payer: COMMERCIAL

## 2024-09-17 DIAGNOSIS — J30.89 ENVIRONMENTAL AND SEASONAL ALLERGIES: Primary | ICD-10-CM

## 2024-09-17 PROCEDURE — 95117 IMMUNOTHERAPY INJECTIONS: CPT | Performed by: ALLERGY & IMMUNOLOGY

## 2024-09-19 ENCOUNTER — HOSPITAL ENCOUNTER (OUTPATIENT)
Dept: MAMMOGRAPHY | Age: 39
Discharge: HOME | End: 2024-09-19
Attending: OBSTETRICS & GYNECOLOGY
Payer: COMMERCIAL

## 2024-09-19 ENCOUNTER — HOSPITAL ENCOUNTER (OUTPATIENT)
Dept: MAMMOGRAPHY | Age: 39
End: 2024-09-19
Payer: COMMERCIAL

## 2024-09-19 DIAGNOSIS — Z12.31 ENCOUNTER FOR SCREENING MAMMOGRAM FOR BREAST CANCER: ICD-10-CM

## 2024-09-19 DIAGNOSIS — Z12.31 ENCOUNTER FOR SCREENING MAMMOGRAM FOR MALIGNANT NEOPLASM OF BREAST: ICD-10-CM

## 2024-09-19 PROCEDURE — 77067 SCR MAMMO BI INCL CAD: CPT | Performed by: OBSTETRICS & GYNECOLOGY

## 2024-09-19 PROCEDURE — 77063 BREAST TOMOSYNTHESIS BI: CPT | Performed by: OBSTETRICS & GYNECOLOGY

## 2024-10-01 ENCOUNTER — NURSE ONLY (OUTPATIENT)
Dept: ALLERGY | Facility: CLINIC | Age: 39
End: 2024-10-01
Payer: COMMERCIAL

## 2024-10-01 DIAGNOSIS — J30.89 ENVIRONMENTAL AND SEASONAL ALLERGIES: Primary | ICD-10-CM

## 2024-10-01 PROCEDURE — 95117 IMMUNOTHERAPY INJECTIONS: CPT | Performed by: ALLERGY & IMMUNOLOGY

## 2024-10-01 PROCEDURE — 95165 ANTIGEN THERAPY SERVICES: CPT | Performed by: ALLERGY & IMMUNOLOGY

## 2024-10-07 ENCOUNTER — NURSE ONLY (OUTPATIENT)
Dept: ALLERGY | Facility: CLINIC | Age: 39
End: 2024-10-07
Payer: COMMERCIAL

## 2024-10-07 DIAGNOSIS — J30.89 ENVIRONMENTAL AND SEASONAL ALLERGIES: Primary | ICD-10-CM

## 2024-10-14 ENCOUNTER — NURSE ONLY (OUTPATIENT)
Dept: ALLERGY | Facility: CLINIC | Age: 39
End: 2024-10-14
Payer: COMMERCIAL

## 2024-10-14 DIAGNOSIS — J30.89 ENVIRONMENTAL AND SEASONAL ALLERGIES: Primary | ICD-10-CM

## 2024-10-16 ENCOUNTER — OFFICE VISIT (OUTPATIENT)
Dept: OBGYN CLINIC | Facility: CLINIC | Age: 39
End: 2024-10-16
Payer: COMMERCIAL

## 2024-10-16 VITALS
HEIGHT: 62 IN | DIASTOLIC BLOOD PRESSURE: 70 MMHG | SYSTOLIC BLOOD PRESSURE: 114 MMHG | BODY MASS INDEX: 23.74 KG/M2 | WEIGHT: 129 LBS

## 2024-10-16 DIAGNOSIS — Z01.419 ENCOUNTER FOR GYNECOLOGICAL EXAMINATION WITHOUT ABNORMAL FINDING: Primary | ICD-10-CM

## 2024-10-16 DIAGNOSIS — Z80.49 FAMILY HISTORY OF UTERINE CANCER: ICD-10-CM

## 2024-10-16 PROCEDURE — 3074F SYST BP LT 130 MM HG: CPT | Performed by: OBSTETRICS & GYNECOLOGY

## 2024-10-16 PROCEDURE — 3008F BODY MASS INDEX DOCD: CPT | Performed by: OBSTETRICS & GYNECOLOGY

## 2024-10-16 PROCEDURE — 3078F DIAST BP <80 MM HG: CPT | Performed by: OBSTETRICS & GYNECOLOGY

## 2024-10-16 PROCEDURE — 99396 PREV VISIT EST AGE 40-64: CPT | Performed by: OBSTETRICS & GYNECOLOGY

## 2024-10-16 NOTE — PROGRESS NOTES
GYN H&P     10/16/2024  2:04 PM    CC: Patient is here for annual.     HPI: Patient is a 39 year old  for annual. LPS . + 10 lb weight  gain in the past year.     Menses: once a month, no heavy bleeding or pain. Using condoms for birth [id number]  Pelvic Pain: None  Vaginal discharge:None      Patient's last menstrual period was 10/03/2024.    OB History    Para Term  AB Living   1 1 1     1   SAB IAB Ectopic Multiple Live Births           1      # Outcome Date GA Lbr Santo/2nd Weight Sex Type Anes PTL Lv   1 Term 10/06/17 37w4d   F Vag-Spont EPI N CLARK      Birth Comments: IUGR       GYN hx:    Hx Prior Abnormal Pap: No  Pap Date: 23  Pap Result Notes: Wnl/-hpv  Follow Up Recommendation: Mammogram: Never    LAST MAMMOGRAM: 2 W ago      Past Medical History:    Anxiety    Family history of uterine cancer    with mom, mat aunt and mat cousin     Past Surgical History:   Procedure Laterality Date    Patient denies any surgical history       Allergies[1]  Family History   Problem Relation Age of Onset    Uterine Cancer Mother 69    Other (spinal stenosis) Father     Heart Disease Father     No Known Problems Brother     COPD Maternal Grandfather     Uterine Cancer Maternal Aunt 20    Uterine Cancer Maternal Cousin Female 20    Breast Cancer Neg     Ovarian Cancer Neg     Colon Cancer Neg     Prostate Cancer Neg      Social History     Socioeconomic History    Marital status:    Occupational History    Occupation: web site designed   Tobacco Use    Smoking status: Former     Types: Cigarettes    Smokeless tobacco: Never    Tobacco comments:     Former use at about age 17    Vaping Use    Vaping status: Never Used   Substance and Sexual Activity    Alcohol use: Yes     Comment: occ    Drug use: Never    Sexual activity: Yes     Partners: Male     Birth control/protection: Condom     Social History     Social History Narrative    Pt denies h/x abuse.    Lives  and daughter    NO hx  of abuse        Medications reviewed. See active list.     /70   Ht 62\"   Wt 129 lb (58.5 kg)   LMP 10/03/2024   BMI 23.59 kg/m²       Exam:   GENERAL: well developed, well nourished, in no apparent distress  SKIN: no rashes, no suspicious lesions  HEENT: normal  NECK: supple; no thyroidmegaly, no adenopathy  BREASTS: symmetrical, nontender, no palpable masses or nodes, no nipple discharge, no skin changes, no dippling, no palpable axillary adenopathy  ABDOMEN: Soft, non distended; non tender, no masses.  Liver and spleen non-tender, no enlargement. No palpable hernias  GYNE/:  External Genitalia: Normal appearing, no lesions, normal hair distribution   Urethral meatus appear wnl, no abnormal discharge or lesions noted.   Bladder: well supported, urethra wnl, no palpable tenderness or masses, no discharge  Vagina: normal pink mucosa, no lesions, normal clear discharge.   Uterus: midline, mobile, non-tender, firm and smooth  Cervix: pink, no lesions grossly visible, no discharge  Adnexa: non tender, no palpable masses, normal size  Anus:  No lesions or visible hemorrhoids        A/P: Patient is 39 year old female     1. Encounter for gynecological examination without abnormal finding    2. Family history of uterine cancer  - Genetic Counseling [facility] Location      Charmaine Adler MD              [1]   Allergies  Allergen Reactions    Sulfamethoxazole W/Trimethoprim RASH     With itching

## 2024-10-28 ENCOUNTER — NURSE ONLY (OUTPATIENT)
Dept: ALLERGY | Facility: CLINIC | Age: 39
End: 2024-10-28
Payer: COMMERCIAL

## 2024-10-28 DIAGNOSIS — J30.89 ENVIRONMENTAL AND SEASONAL ALLERGIES: Primary | ICD-10-CM

## 2024-10-30 ENCOUNTER — TELEPHONE (OUTPATIENT)
Dept: OBGYN CLINIC | Facility: CLINIC | Age: 39
End: 2024-10-30

## 2024-10-30 RX ORDER — FLUOXETINE 10 MG/1
10 CAPSULE ORAL DAILY
Qty: 30 CAPSULE | Refills: 0 | Status: SHIPPED | OUTPATIENT
Start: 2024-10-30

## 2024-10-30 NOTE — TELEPHONE ENCOUNTER
Disp Refills Start End    FLUoxetine 10 MG Oral Cap 90 capsule 3 9/15/2023 --    Sig - Route: Take 1 capsule (10 mg total) by mouth daily. - Oral    Sent to pharmacy as: FLUoxetine HCl 10 MG Oral Capsule (PROzac)    E-Prescribing Status: Receipt confirmed by pharmacy (9/15/2023 10:52 AM CDT)

## 2024-11-04 RX ORDER — LEVOCETIRIZINE DIHYDROCHLORIDE 5 MG/1
5 TABLET, FILM COATED ORAL EVERY EVENING
Qty: 90 TABLET | Refills: 1 | Status: SHIPPED | OUTPATIENT
Start: 2024-11-04

## 2024-11-05 RX ORDER — FLUOXETINE 10 MG/1
10 CAPSULE ORAL DAILY
Qty: 90 CAPSULE | Refills: 3 | Status: SHIPPED | OUTPATIENT
Start: 2024-11-05

## 2024-11-05 NOTE — TELEPHONE ENCOUNTER
Received a refill request for Xyzal 5mg- 1 tablet by mouth daily.    Last office visit was 5/13/24 for allergies.    Refill meets protocol- refilled.

## 2024-11-11 ENCOUNTER — NURSE ONLY (OUTPATIENT)
Dept: ALLERGY | Facility: CLINIC | Age: 39
End: 2024-11-11

## 2024-11-11 DIAGNOSIS — J30.89 ENVIRONMENTAL AND SEASONAL ALLERGIES: Primary | ICD-10-CM

## 2024-11-25 ENCOUNTER — NURSE ONLY (OUTPATIENT)
Dept: ALLERGY | Facility: CLINIC | Age: 39
End: 2024-11-25
Payer: COMMERCIAL

## 2024-11-25 DIAGNOSIS — J30.89 ENVIRONMENTAL AND SEASONAL ALLERGIES: Primary | ICD-10-CM

## 2024-11-26 ENCOUNTER — TELEMEDICINE (OUTPATIENT)
Dept: ALLERGY | Facility: CLINIC | Age: 39
End: 2024-11-26
Payer: COMMERCIAL

## 2024-11-26 DIAGNOSIS — J30.89 SEASONAL AND PERENNIAL ALLERGIC RHINOCONJUNCTIVITIS: Primary | ICD-10-CM

## 2024-11-26 DIAGNOSIS — J30.2 SEASONAL AND PERENNIAL ALLERGIC RHINOCONJUNCTIVITIS: Primary | ICD-10-CM

## 2024-11-26 DIAGNOSIS — Z23 FLU VACCINE NEED: ICD-10-CM

## 2024-11-26 DIAGNOSIS — Z23 NEED FOR COVID-19 VACCINE: ICD-10-CM

## 2024-11-26 DIAGNOSIS — H10.10 SEASONAL AND PERENNIAL ALLERGIC RHINOCONJUNCTIVITIS: Primary | ICD-10-CM

## 2024-11-26 PROCEDURE — 99214 OFFICE O/P EST MOD 30 MIN: CPT | Performed by: ALLERGY & IMMUNOLOGY

## 2024-11-26 NOTE — PROGRESS NOTES
Georgie Boykin is a 39 year old female.    HPI:   No chief complaint on file.    Patient is a 39-year-old female who presents for follow-up via video visit      This visit is conducted using Telemedicine with live, interactive video and audio during this Coronavirus pandemic.     Please note that the following visit was completed using two-way, real-time interactive audio and/or video communication.  This has been done in good isra to provide continuity of care in the best interest of the provider-patient relationship, due to the ongoing public health crisis/national emergency and because of restrictions of visitation.  There are limitations of this visit as no physical exam could be performed.  Every conscious effort was taken to allow for sufficient and adequate time.  This billing was spent on reviewing labs, medications, radiology tests and decision making.  Appropriate medical decision-making and tests are ordered as detailed in the plan of care below     patient last seen by me in May 2024  History of allergic rhinitis.  Prior skin testing positive to weeds mold and dog.  Current immunotherapy due to his underlying environmental allergens.  2 dogs in the home.  Medication list include Xyzal and Flonase    Immunizations reviewed.  COVID-vaccine x 5 doses last in October 2023.  No flu vaccine on record    Today patient reports    Ar:  Active or persistent symptoms: denies   Active meds Xyzal, Flonase   pets : 2 dog   Patient denies adverse reactions to immunotherapy in the interim  Overall immunotherapy is helping decrease symptoms and the need for medication  Patient recently reached maintenance dosing of this month, every 2 weeks     Flu utd     HISTORY:  Past Medical History:    Anxiety    Family history of uterine cancer    with mom, mat aunt and mat cousin      Past Surgical History:   Procedure Laterality Date    Patient denies any surgical history        Family History   Problem Relation Age of  Onset    Uterine Cancer Mother 69    Other (spinal stenosis) Father     Heart Disease Father     No Known Problems Brother     COPD Maternal Grandfather     Uterine Cancer Maternal Aunt 20    Uterine Cancer Maternal Cousin Female 20    Breast Cancer Neg     Ovarian Cancer Neg     Colon Cancer Neg     Prostate Cancer Neg       Social History:   Social History     Socioeconomic History    Marital status:    Occupational History    Occupation: web site designed   Tobacco Use    Smoking status: Former     Types: Cigarettes    Smokeless tobacco: Never    Tobacco comments:     Former use at about age 17    Vaping Use    Vaping status: Never Used   Substance and Sexual Activity    Alcohol use: Yes     Comment: occ    Drug use: Never    Sexual activity: Yes     Partners: Male     Birth control/protection: Condom   Other Topics Concern    Blood Transfusions No   Social History Narrative    Pt denies h/x abuse.    Lives  and daughter    NO hx of abuse         Medications (Active prior to today's visit):  Current Outpatient Medications   Medication Sig Dispense Refill    FLUoxetine 10 MG Oral Cap Take 1 capsule (10 mg total) by mouth daily. 90 capsule 3    LEVOCETIRIZINE 5 MG Oral Tab TAKE 1 TABLET BY MOUTH EVERY DAY IN THE EVENING 90 tablet 1    fluticasone propionate 50 MCG/ACT Nasal Suspension 2 sprays by Each Nare route 2 (two) times daily.      spironolactone 50 MG Oral Tab          Allergies:  Allergies[1]      ROS:   Allergic/Immuno:  See hpi  Cardiovascular:  Negative for irregular heartbeat/palpitations, chest pain, edema  Constitutional:  Negative night sweats,weight loss, irritability and lethargy  ENMT:  Negative for ear drainage, hearing loss and nasal drainage  Eyes:  Negative for eye discharge and vision loss  Gastrointestinal:  Negative for abdominal pain, diarrhea and vomiting  Integumentary:  Negative for pruritus and rash  Respiratory:  Negative for cough, dyspnea and wheezing    PHYSICAL EXAM:    Constitutional: responsive, no acute distress noted  Head/Face: NC/Atraumatic  Speaking in full sentences no increased work of breathing  A&O x 3     ASSESSMENT/PLAN:   Assessment   Encounter Diagnoses   Name Primary?    Seasonal and perennial allergic rhinoconjunctivitis Yes    Need for COVID-19 vaccine     Flu vaccine need        #1 allergic rhinitis  Patient recently reached maintenance dose immunotherapy this month.  Currently on maintenance dosing every 2 weeks.  Will titrate up to every 4 weeks.  Reviewed 3 to 5 years recommended at the every 4-week maintenance dosing.  Continue with Xyzal and Flonase  Reviewed avoidance measures.  Patient is already noticed improvement with being on immunotherapy to date    2.  Flu vaccine recommended and offered  Flu vaccine up-to-date    3.  COVID vaccines reviewed.  Recommend booster.  Most recent boosters in September 2024.  Please check with local pharmacy    #4 Bactrim allergy  Continue to avoid.      Follow-up in 1 year or sooner if needed         Orders This Visit:  No orders of the defined types were placed in this encounter.      Meds This Visit:  Requested Prescriptions      No prescriptions requested or ordered in this encounter       Imaging & Referrals:  None     11/26/2024  Osman Urrutia MD    If medication samples were provided today, they were provided solely for patient education and training related to self administration of these medications.  Teaching, instruction and sample was provided to the patient by myself.  Teaching included  a review of potential adverse side effects as well as potential efficacy.  Patient's questions were answered in regards to medication administration and dosing and potential side effects. Teaching was provided via the teach back method         [1]   Allergies  Allergen Reactions    Sulfamethoxazole W/Trimethoprim RASH     With itching

## 2024-11-26 NOTE — PATIENT INSTRUCTIONS
#1 allergic rhinitis  Patient recently reached maintenance dose immunotherapy this month.  Currently on maintenance dosing every 2 weeks.  Will titrate up to every 4 weeks.  Reviewed 3 to 5 years recommended at the every 4-week maintenance dosing.  Continue with Xyzal and Flonase  Reviewed avoidance measures.  Patient is already noticed improvement with being on immunotherapy to date    2.  Flu vaccine recommended and offered  Flu vaccine up-to-date    3.  COVID vaccines reviewed.  Recommend booster.  Most recent boosters in September 2024.  Please check with local pharmacy    Follow-up in 1 year or sooner if needed

## 2024-12-16 ENCOUNTER — NURSE ONLY (OUTPATIENT)
Dept: ALLERGY | Facility: CLINIC | Age: 39
End: 2024-12-16
Payer: COMMERCIAL

## 2024-12-16 DIAGNOSIS — J30.89 ENVIRONMENTAL AND SEASONAL ALLERGIES: Primary | ICD-10-CM

## 2024-12-16 PROCEDURE — 95117 IMMUNOTHERAPY INJECTIONS: CPT | Performed by: ALLERGY & IMMUNOLOGY

## 2025-01-06 ENCOUNTER — NURSE ONLY (OUTPATIENT)
Dept: ALLERGY | Facility: CLINIC | Age: 40
End: 2025-01-06
Payer: COMMERCIAL

## 2025-01-06 DIAGNOSIS — J30.89 ENVIRONMENTAL AND SEASONAL ALLERGIES: Primary | ICD-10-CM

## 2025-01-06 PROCEDURE — 95115 IMMUNOTHERAPY ONE INJECTION: CPT | Performed by: ALLERGY & IMMUNOLOGY

## 2025-02-03 ENCOUNTER — NURSE ONLY (OUTPATIENT)
Dept: ALLERGY | Facility: CLINIC | Age: 40
End: 2025-02-03
Payer: COMMERCIAL

## 2025-02-03 DIAGNOSIS — J30.89 ENVIRONMENTAL AND SEASONAL ALLERGIES: Primary | ICD-10-CM

## 2025-03-03 ENCOUNTER — NURSE ONLY (OUTPATIENT)
Dept: ALLERGY | Facility: CLINIC | Age: 40
End: 2025-03-03
Payer: COMMERCIAL

## 2025-03-03 DIAGNOSIS — J30.89 ENVIRONMENTAL AND SEASONAL ALLERGIES: Primary | ICD-10-CM

## 2025-03-31 ENCOUNTER — NURSE ONLY (OUTPATIENT)
Dept: ALLERGY | Facility: CLINIC | Age: 40
End: 2025-03-31
Payer: COMMERCIAL

## 2025-03-31 DIAGNOSIS — J30.89 ENVIRONMENTAL AND SEASONAL ALLERGIES: Primary | ICD-10-CM

## 2025-04-06 ENCOUNTER — PATIENT MESSAGE (OUTPATIENT)
Dept: ALLERGY | Facility: CLINIC | Age: 40
End: 2025-04-06

## 2025-04-07 RX ORDER — FLUTICASONE PROPIONATE 50 MCG
2 SPRAY, SUSPENSION (ML) NASAL 2 TIMES DAILY
Qty: 3 EACH | Refills: 1 | Status: SHIPPED | OUTPATIENT
Start: 2025-04-07

## 2025-04-07 NOTE — TELEPHONE ENCOUNTER
Received a refill request for Flonase nasal spray- 2 sprays each nostril daily.    Last office visit was 11/26/24 for allergies. Per Dr. Urrutia's progress notes to follow up in 1 year.    Refill meets protocol- refilled.

## 2025-04-28 ENCOUNTER — NURSE ONLY (OUTPATIENT)
Dept: ALLERGY | Facility: CLINIC | Age: 40
End: 2025-04-28
Payer: COMMERCIAL

## 2025-04-28 DIAGNOSIS — J30.89 ENVIRONMENTAL AND SEASONAL ALLERGIES: Primary | ICD-10-CM

## 2025-04-28 PROCEDURE — 95117 IMMUNOTHERAPY INJECTIONS: CPT | Performed by: ALLERGY & IMMUNOLOGY

## 2025-05-03 RX ORDER — LEVOCETIRIZINE DIHYDROCHLORIDE 5 MG/1
5 TABLET, FILM COATED ORAL EVERY EVENING
Qty: 90 TABLET | Refills: 1 | Status: SHIPPED | OUTPATIENT
Start: 2025-05-03

## 2025-05-03 NOTE — TELEPHONE ENCOUNTER
Refill requested for    Name from pharmacy: LEVOCETIRIZINE 5 MG TABLET         Will file in chart as: LEVOCETIRIZINE 5 MG Oral Tab    Sig: TAKE 1 TABLET BY MOUTH EVERY DAY IN THE EVENING    Disp: 90 tablet    Refills: 1    Start: 5/3/2025    Class: Normal    Non-formulary    Last ordered: 6 months ago (11/4/2024) by Osman Urrutia MD    Last refill: 2/2/2025    Rx #: [id number]    Antihistamines Passed 05/03/2025 07:13 AM   Protocol Details Appt in past 12 mos or next 1 mos    Medication is active on med list      To be filled at: [facility] [id number] [place] [address] [phone], [phone]          Last office visit: 11/26/2024    Previously advised to follow up in Follow-up in 1 year or sooner if needed    F/U currently scheduled? Not at this time       ACTION: Refilled per protocol.

## 2025-05-27 ENCOUNTER — NURSE ONLY (OUTPATIENT)
Dept: ALLERGY | Facility: CLINIC | Age: 40
End: 2025-05-27
Payer: COMMERCIAL

## 2025-05-27 DIAGNOSIS — J30.89 ENVIRONMENTAL AND SEASONAL ALLERGIES: Primary | ICD-10-CM

## 2025-05-27 PROCEDURE — 95117 IMMUNOTHERAPY INJECTIONS: CPT | Performed by: ALLERGY & IMMUNOLOGY

## 2025-06-23 ENCOUNTER — NURSE ONLY (OUTPATIENT)
Dept: ALLERGY | Facility: CLINIC | Age: 40
End: 2025-06-23

## 2025-06-23 DIAGNOSIS — J30.89 ENVIRONMENTAL AND SEASONAL ALLERGIES: Primary | ICD-10-CM

## 2025-06-23 PROCEDURE — 95117 IMMUNOTHERAPY INJECTIONS: CPT | Performed by: ALLERGY & IMMUNOLOGY

## 2025-06-23 PROCEDURE — 95165 ANTIGEN THERAPY SERVICES: CPT | Performed by: ALLERGY & IMMUNOLOGY

## 2025-07-09 RX ORDER — FLUTICASONE PROPIONATE 50 MCG
2 SPRAY, SUSPENSION (ML) NASAL 2 TIMES DAILY
Qty: 96 ML | Refills: 1 | Status: SHIPPED | OUTPATIENT
Start: 2025-07-09

## 2025-07-09 NOTE — TELEPHONE ENCOUNTER
Refill requested for    Name from pharmacy: FLUTICASONE PROP 50 MCG SPRAY         Will file in chart as: FLUTICASONE PROPIONATE 50 MCG/ACT Nasal Suspension    Sig: SPRAY 2 SPRAYS INTO EACH NOSTRIL TWICE A DAY    Disp: 96 mL    Refills: 0 (Pharmacy requested: Not specified)    Start: 7/9/2025    Class: Normal    Non-formulary    Last ordered: 3 months ago (4/7/2025) by Osman Urrutia MD    Last refill: 4/7/2025    Rx #: [id number]    Antihistamines Failed 07/09/2025 12:26 AM   Protocol Details Medication is active on med list    Appt in past 12 mos or next 1 mos      To be filled at: [facility] [address] [place] [address] [phone], [phone]          Last office visit: 11/26/2024    Previously advised to follow up in Follow-up in 1 year or sooner if needed     F/U currently scheduled? Not at this time. RN left message because she also does not have any injection appt schedule either. Will be due in two weeks.      ACTION: Refilled per protocol.

## 2025-07-11 ENCOUNTER — HOSPITAL ENCOUNTER (OUTPATIENT)
Age: 40
Discharge: HOME | End: 2025-07-11
Payer: COMMERCIAL

## 2025-07-11 VITALS
OXYGEN SATURATION: 100 % | SYSTOLIC BLOOD PRESSURE: 103 MMHG | HEART RATE: 100 BPM | DIASTOLIC BLOOD PRESSURE: 69 MMHG | TEMPERATURE: 98 F | RESPIRATION RATE: 16 BRPM

## 2025-07-11 DIAGNOSIS — U07.1 COVID-19: Primary | ICD-10-CM

## 2025-07-11 DIAGNOSIS — R05.9 COUGH: ICD-10-CM

## 2025-07-11 LAB — SARS-COV-2 RNA RESP QL NAA+PROBE: DETECTED

## 2025-07-11 RX ORDER — BENZONATATE 100 MG/1
100 CAPSULE ORAL 3 TIMES DAILY PRN
Qty: 30 CAPSULE | Refills: 0 | Status: SHIPPED | OUTPATIENT
Start: 2025-07-11 | End: 2025-07-21

## 2025-07-11 NOTE — ED PROVIDER NOTES
Patient Seen in: [facility]        History  Chief Complaint   Patient presents with    Cough/URI     Stated Complaint: CHILLS, RUNNY NOISE, HEADACHE, PASS OUT LAST NIGHT, FATIGUE    Subjective: This is a 39-year-old female, past medical history of anxiety, presents to immediate care clinic for evaluation of subjective fever, chills, fatigue, body aches, headache, sinus pressure, cough, congestion since Wednesday.  Patient states chills and bodyaches yesterday night.  She did have an episode of lightheadedness on her way to the bathroom where she had to lie down on the floor and be helped back by her  to bed.  She denies any chest pain, dizziness, palpitations, shortness of breath with this episode.  No LOC.  Did not hit her head.  Simply felt so weak and fatigued \"I had to lay down\".  Positive recent travel.  Patient states they were in [place] and got home Tuesday prior to symptom onset.  Vital stable.  She is speaking in complete and full sentences without difficulty.  AO x 4  The history is provided by the patient.                     Objective:     Past Medical History:    Anxiety    Family history of uterine cancer    with mom, mat aunt and mat cousin              Past Surgical History:   Procedure Laterality Date    Patient denies any surgical history                  Social History     Socioeconomic History    Marital status:    Occupational History    Occupation: web site designed   Tobacco Use    Smoking status: Former     Types: Cigarettes    Smokeless tobacco: Never    Tobacco comments:     Former use at about age 17    Vaping Use    Vaping status: Never Used   Substance and Sexual Activity    Alcohol use: Yes     Comment: occ    Drug use: Never    Sexual activity: Yes     Partners: Male     Birth control/protection: Condom   Other Topics Concern    Blood Transfusions No   Social History Narrative    Pt denies h/x abuse.    Lives  and daughter    NO hx of abuse                Review of Systems   Constitutional:  Positive for activity change, appetite change, chills, fatigue and fever.   HENT:  Positive for congestion, rhinorrhea, sinus pressure, sinus pain, sneezing and sore throat. Negative for ear discharge and ear pain.    Eyes: Negative.    Respiratory:  Positive for cough. Negative for chest tightness, shortness of breath, wheezing and stridor.    Cardiovascular: Negative.    Gastrointestinal: Negative.    Genitourinary: Negative.    Musculoskeletal:  Positive for arthralgias and myalgias. Negative for back pain, gait problem, joint swelling, neck pain and neck stiffness.   Skin: Negative.    Neurological:  Positive for light-headedness and headaches. Negative for dizziness, tremors, syncope, weakness and numbness.       Positive for stated complaint: CHILLS, RUNNY NOISE, HEADACHE, PASS OUT LAST NIGHT, FATIGUE  Other systems are as noted in HPI.  Constitutional and vital signs reviewed.      All other systems reviewed and negative except as noted above.                  Physical Exam    ED Triage Vitals [07/11/25 1102]   /69   Pulse 100   Resp 16   Temp 97.7 °F (36.5 °C)   Temp src Oral   SpO2 100 %   O2 Device None (Room air)       Current Vitals:   Vital Signs  BP: 103/69  Pulse: 100  Resp: 16  Temp: 97.7 °F (36.5 °C)  Temp src: Oral    Oxygen Therapy  SpO2: 100 %  O2 Device: None (Room air)          Physical Exam  Constitutional:       General: She is not in acute distress.     Appearance: Normal appearance. She is not ill-appearing.   HENT:      Head: Normocephalic.      Jaw: There is normal jaw occlusion.      Right Ear: Tympanic membrane, ear canal and external ear normal.      Left Ear: Tympanic membrane, ear canal and external ear normal.      Nose: Congestion present.      Right Sinus: No maxillary sinus tenderness or frontal sinus tenderness.      Left Sinus: No maxillary sinus tenderness or frontal sinus tenderness.      Mouth/Throat:      Lips: Pink. No  lesions.      Mouth: Mucous membranes are moist. No oral lesions.      Tongue: No lesions.      Palate: No lesions.      Pharynx: Oropharynx is clear. Uvula midline. No pharyngeal swelling, oropharyngeal exudate, posterior oropharyngeal erythema, uvula swelling or postnasal drip.   Eyes:      General:         Right eye: No discharge.         Left eye: No discharge.      Extraocular Movements: Extraocular movements intact.      Pupils: Pupils are equal, round, and reactive to light.   Cardiovascular:      Rate and Rhythm: Normal rate and regular rhythm.      Pulses: Normal pulses.      Heart sounds: Normal heart sounds.   Pulmonary:      Effort: Pulmonary effort is normal. No respiratory distress.      Breath sounds: Normal breath sounds. No stridor. No wheezing, rhonchi or rales.   Chest:      Chest wall: No tenderness.   Musculoskeletal:         General: Normal range of motion.      Cervical back: Normal range of motion.   Lymphadenopathy:      Cervical: No cervical adenopathy.   Skin:     General: Skin is warm.      Capillary Refill: Capillary refill takes less than 2 seconds.   Neurological:      General: No focal deficit present.      Mental Status: She is alert and oriented to person, place, and time.                 ED Course  Labs Reviewed   RAPID SARS-COV-2 BY PCR - Abnormal; Notable for the following components:       Result Value    Rapid SARS-CoV-2 by PCR Detected (*)     All other components within normal limits                            MDM     Differentials considered include: COVID-19, viral URI, pneumonia, bronchitis, allergic rhinitis    Patient afebrile on arrival.  Vital stable.  Well-appearing.  Speaking in complete and full sentences without difficulty.    Lungs are clear on examination.  No wheezing, rhonchi, rales.  No coarse or diminished breath sounds.  No evidence of pneumonia or bronchitis.    Bilateral TMs visualized with good landmarks and light reflex.  No erythema, bulging,  perforation, retraction.  No evidence of AOM.      Posterior pharynx without erythema, edema, exudate.  Uvula midline and normal in size.  Mucous membranes moist.  No intraoral lesions or petechiae.  No cervical lymphadenopathy.  Patient is tolerating her own secretions well without difficulty.  Does not complain of razor blade sore throat.  No evidence to suggest strep pharyngitis or peritonsillar abscess.    Patient was positive for COVID-19.  I do not think she needs any Paxlovid.  She is young, healthy, without significant comorbidities.  Physical exam and vitals reassuring.    Did discuss supportive and symptomatic management at home.  Did discuss CDC guidelines regarding quarantine and wearing a mask for 5 days since symptom onset and while symptomatic.    Patient agreeable to receive cough suppressant, sent to pharmacy.    Patient is aware of typical length of duration of COVID-19 symptoms as well as signs symptoms that warrant immediate ER evaluation        Medical Decision Making      Disposition and Plan     Clinical Impression:  1. COVID-19    2. Cough         Disposition:  Discharge  7/11/2025 11:36 am    Follow-up:  Cyndi Hui MD  [address] [id number] [phone]    Schedule an appointment as soon as possible for a visit             Medications Prescribed:  Discharge Medication List as of 7/11/2025 11:37 AM        START taking these medications    Details   benzonatate 100 MG Oral Cap Take 1 capsule (100 mg total) by mouth 3 (three) times daily as needed for cough., Normal, Disp-30 capsule, R-0                   Supplementary Documentation:

## 2025-07-11 NOTE — ED INITIAL ASSESSMENT (HPI)
Pt presents to the IC with c/o chills, fatigue, headache, sinus pressure, mild cough, and severe nasal congestion since Wednesday. No known fevers at home. Pt has been using advil the 1st 2 days but nothing today.

## 2025-07-11 NOTE — DISCHARGE INSTRUCTIONS
As discussed, you are positive for COVID-19.  The CDC recommends only 24 hours of quarantine and/or until you are fever free.  They also recommend wearing a mask for 5 days while in a public setting.    Drink plenty of water and electrolytes.  Get plenty of rest.  Sleep somewhat elevated and upright.  Sleep with humidifier.  Steam showers for cough and congestion.  Salt water gargles, warm tea with honey and lemon, Cepacol lozenges over-the-counter for throat discomfort.    I have prescribed a cough suppressant that you can take up to 3 times a day.    Wipe down common household surfaces such as handles, kitchen sink, fridge, appliances, bathroom excetra with disinfectant such as Lysol.    Symptoms of COVID-19 can last 5 days to 2 weeks.  If you are not improving in 5 to 7 days, recommend following up with your PCP.    Please go to emergency room if you have any chest pain, dizziness, lightheadedness, palpitations, shortness of breath.

## 2025-07-22 NOTE — TELEPHONE ENCOUNTER
Pt positive COVID test on 7/11/2025. Pt needs to wait fourteen days prior to receiving injections, with a resolution of symptoms. Can receive injections on 7/26/25 and later. We are not here that day, so next available in office day would be 7/28/2025.   Her last allergy injection was on 6/23/2025.  7/28/25 will be her 35th day, so we can still hold her injection if she is feeling better by then.     Rescheduled patient to 7/28/2025. If patient is not feeling better by then, will cancel injection appt and notify office.   No further action needed.

## 2025-07-28 ENCOUNTER — NURSE ONLY (OUTPATIENT)
Dept: ALLERGY | Facility: CLINIC | Age: 40
End: 2025-07-28
Payer: COMMERCIAL

## 2025-07-28 DIAGNOSIS — J30.89 ENVIRONMENTAL AND SEASONAL ALLERGIES: Primary | ICD-10-CM

## 2025-08-25 ENCOUNTER — NURSE ONLY (OUTPATIENT)
Dept: ALLERGY | Facility: CLINIC | Age: 40
End: 2025-08-25

## 2025-08-25 DIAGNOSIS — J30.89 ENVIRONMENTAL AND SEASONAL ALLERGIES: Primary | ICD-10-CM

## (undated) NOTE — LETTER
[facility] Felix Delgado, [address] [address]  [facility]: [phone]  FAX: [phone]        23  Winston Jean, :  1985  [address]  [address] [place] [address]      To whom it may concern    Dr. Britton Funk deems that for his patient, Winston Jean     Allergy shots/immunotherapy are of medical necessity and are needed to treat her underlying environmental allergen to provide preventative treatment      Please contact us for any questions or concerns  Allergy Staff  [phone]